# Patient Record
Sex: FEMALE | Race: WHITE
[De-identification: names, ages, dates, MRNs, and addresses within clinical notes are randomized per-mention and may not be internally consistent; named-entity substitution may affect disease eponyms.]

---

## 2016-10-21 VITALS
SYSTOLIC BLOOD PRESSURE: 120 MMHG | DIASTOLIC BLOOD PRESSURE: 76 MMHG | SYSTOLIC BLOOD PRESSURE: 120 MMHG | DIASTOLIC BLOOD PRESSURE: 76 MMHG

## 2018-03-14 ENCOUNTER — HOSPITAL ENCOUNTER (OUTPATIENT)
Dept: HOSPITAL 24 - RAD | Age: 43
End: 2018-03-14
Attending: OBSTETRICS & GYNECOLOGY
Payer: COMMERCIAL

## 2018-03-14 DIAGNOSIS — Z12.31: Primary | ICD-10-CM

## 2018-03-14 PROCEDURE — 77067 SCR MAMMO BI INCL CAD: CPT

## 2018-03-15 NOTE — MG
HISTORY:  SCREENING 



Comparison: Baseline exam



FINDINGS: 

Bilateral CC and MLO projections of the right and left breast were obtained. Scattered fibroglandular
 tissue is noted bilaterally. A 5 mm rounded asymmetric density is identified in the posterior 3rd of
 the right breast at the level of the nipple line, on the MLO view. No other sign of dominant mass, a
reas architectural distortion, or suspicious microcalcifications are seen in either breast..  No skin
 thickening or nipple retraction is appreciated.   No pathological lymphadenopathy can be identified.
 Benign-appearing calcifications scattered throughout the right and left breasts are observed.

 

IMPRESSION:  5 MM NODULAR DENSITY IS IDENTIFIED POSTERIORLY IN THE RIGHT BREAST.

 

ACR CATEGORY: 0 - NEED ADDITIONAL IMAGING EVALUATION

 

PATIENT SHOULD BE BROUGHT BACK FOR SPOT COMPRESSION CC, MLO, AND ML VIEWS OF THE RIGHT BREAST. IN ADD
ITION TARGETED RIGHT BREAST ULTRASOUND MAY ALSO BE WARRANTED. 



Diagnostic CAD was utilized and reviewed.



* 0 (ZERO) - ASSESSMENT INCOMPLETE; ADDITIONAL IMAGING IS NEEDED. 

* 1/1 (ONE) - NEGATIVE. 

* 2/II (TWO) - BENIGN FINDINGS.

 * 3/III (THREE) - PROBABLY BENIGN FINDING; SHORT INTERVAL FOLLOW-UP

SUGGESTED.

 * 4/IV (FOUR) - SUSPICIOUS ABNORMALITY; BIOPSY SHOULD BE CONSIDERED.

 * 5/V - HIGHLY SUSPICIOUS OF MALIGNANCY; BIOPSY SHOULD BE PERFORMED.

 

A NEGATIVE X-RAY REPORT SHOULD NOT DELAY BIOPSY IF A DOMINANT OR

CLINICALLY SUSPICIOUS MASS IS PRESENT; 4 TO 8 PERCENT OF CANCERS ARE NOT IDENTIFIED BY X-RAY.  A NEGA
TIVE REPORT MAY REINFORCE THE CLINICAL IMPRESSION.  ADENOSIS AND DENSE BREASTS MAY OBSCURE AN UNDERLY
ING NEOPLASM.



Reported By:Electronically Signed by EDINSON DAVENPORT MD at 3/15/2018 10:35:31 AM

## 2018-03-29 ENCOUNTER — HOSPITAL ENCOUNTER (OUTPATIENT)
Dept: HOSPITAL 24 - RAD | Age: 43
End: 2018-03-29
Attending: OBSTETRICS & GYNECOLOGY
Payer: COMMERCIAL

## 2018-03-29 DIAGNOSIS — R92.2: Primary | ICD-10-CM

## 2018-03-29 PROCEDURE — 76642 ULTRASOUND BREAST LIMITED: CPT

## 2018-03-29 PROCEDURE — 77065 DX MAMMO INCL CAD UNI: CPT

## 2018-03-29 NOTE — MG
HISTORY:  Abnormal screening mammography with right breast nodule



Right breast digital diagnostic mammography with CAD and right breast ultrasound. 



Comparison: March 14, 2018



FINDINGS: 



Mammogram: Spot compression mL, CC, and MLO projections of the right breast were obtained.  Scattered
 fibroglandular tissue is seen to be present with persistent scattered regions of partially obscured 
and partially circumscribed isodense nodularity as well as scattered focal asymmetry for which correl
ation with ultrasound is recommended.  There is no dominant mass or architectural distortion.  No ski
n thickening or nipple retraction is appreciated.   



Ultrasound: Multiple grayscale and color Doppler images of the inferior right breast were obtained fr
om 10-2 o'clock. At 8 o'clock approximately 4 cm from the nipple, there is a horizontally oriented 9 
mm macro lobulated but smoothly marginated and well circumscribed predominantly cystic appearing nodu
le with posterior acoustical enhancement and no internal Doppler flow which could correspond to the d
ominant mammographic nodule and which is most consistent with a benign mildly complex cyst with inter
nal debris and septation without a definite peripheral solid nodular component but for which short-te
rm follow-up is recommended. Additionally, at 10 o'clock approximately 4 cm from the nipple, there is
 a focal nodular region of dense fibrocystic breast parenchyma measuring 8 mm with posterior acoustic
al shadowing and no internal vascularity with predominantly hyperechoic features. 

 

IMPRESSION:  Probably benign regions of right breast focal asymmetry and nodularity for which six-mon
th follow-up right breast mammography and ultrasound are recommended.  

 

ACR CATEGORY 3 - probably benign findings; short interval follow-up suggested.

 

Diagnostic CAD was utilized and reviewed.



* 0 (ZERO) - ASSESSMENT INCOMPLETE; ADDITIONAL IMAGING IS NEEDED. 

* 1/1 (ONE) - NEGATIVE. 

* 2/II (TWO) - BENIGN FINDINGS.

 * 3/III (THREE) - PROBABLY BENIGN FINDING; SHORT INTERVAL FOLLOW-UP

SUGGESTED.

 * 4/IV (FOUR) - SUSPICIOUS ABNORMALITY; BIOPSY SHOULD BE CONSIDERED.

 * 5/V - HIGHLY SUSPICIOUS OF MALIGNANCY; BIOPSY SHOULD BE PERFORMED.

 

A NEGATIVE X-RAY REPORT SHOULD NOT DELAY BIOPSY IF A DOMINANT OR

CLINICALLY SUSPICIOUS MASS IS PRESENT; 4 TO 8 PERCENT OF CANCERS ARE NOT IDENTIFIED BY X-RAY.  A NEGA
TIVE REPORT MAY REINFORCE THE CLINICAL IMPRESSION.  ADENOSIS AND DENSE BREASTS MAY OBSCURE AN UNDERLY
ING NEOPLASM.







Reported By:Electronically Signed by HERIBERTO UMAÑA MD at 3/29/2018 3:55:16 PM

## 2018-03-29 NOTE — US
HISTORY:  Abnormal screening mammography with right breast nodule



Right breast digital diagnostic mammography with CAD and right breast ultrasound. 



Comparison: March 14, 2018



FINDINGS: 



Mammogram: Spot compression mL, CC, and MLO projections of the right breast were obtained.  Scattered
 fibroglandular tissue is seen to be present with persistent scattered regions of partially obscured 
and partially circumscribed isodense nodularity as well as scattered focal asymmetry for which correl
ation with ultrasound is recommended.  There is no dominant mass or architectural distortion.  No ski
n thickening or nipple retraction is appreciated.   



Ultrasound: Multiple grayscale and color Doppler images of the inferior right breast were obtained fr
om 10-2 o'clock. At 8 o'clock approximately 4 cm from the nipple, there is a horizontally oriented 9 
mm macro lobulated but smoothly marginated and well circumscribed predominantly cystic appearing nodu
le with posterior acoustical enhancement and no internal Doppler flow which could correspond to the d
ominant mammographic nodule and which is most consistent with a benign mildly complex cyst with inter
nal debris and septation without a definite peripheral solid nodular component but for which short-te
rm follow-up is recommended. Additionally, at 10 o'clock approximately 4 cm from the nipple, there is
 a focal nodular region of dense fibrocystic breast parenchyma measuring 8 mm with posterior acoustic
al shadowing and no internal vascularity with predominantly hyperechoic features. 

 

IMPRESSION:  Probably benign regions of right breast focal asymmetry and nodularity for which six-mon
th follow-up right breast mammography and ultrasound are recommended.  

 

ACR CATEGORY 3 - probably benign findings; short interval follow-up suggested.

 

Diagnostic CAD was utilized and reviewed.



* 0 (ZERO) - ASSESSMENT INCOMPLETE; ADDITIONAL IMAGING IS NEEDED. 

* 1/1 (ONE) - NEGATIVE. 

* 2/II (TWO) - BENIGN FINDINGS.

 * 3/III (THREE) - PROBABLY BENIGN FINDING; SHORT INTERVAL FOLLOW-UP

SUGGESTED.

 * 4/IV (FOUR) - SUSPICIOUS ABNORMALITY; BIOPSY SHOULD BE CONSIDERED.

 * 5/V - HIGHLY SUSPICIOUS OF MALIGNANCY; BIOPSY SHOULD BE PERFORMED.

 

A NEGATIVE X-RAY REPORT SHOULD NOT DELAY BIOPSY IF A DOMINANT OR

CLINICALLY SUSPICIOUS MASS IS PRESENT; 4 TO 8 PERCENT OF CANCERS ARE NOT IDENTIFIED BY X-RAY.  A NEGA
TIVE REPORT MAY REINFORCE THE CLINICAL IMPRESSION.  ADENOSIS AND DENSE BREASTS MAY OBSCURE AN UNDERLY
ING NEOPLASM.









Reported By:Electronically Signed by HERIBERTO UMAÑA MD at 3/29/2018 3:56:16 PM